# Patient Record
Sex: MALE | Race: WHITE | NOT HISPANIC OR LATINO | Employment: FULL TIME | URBAN - METROPOLITAN AREA
[De-identification: names, ages, dates, MRNs, and addresses within clinical notes are randomized per-mention and may not be internally consistent; named-entity substitution may affect disease eponyms.]

---

## 2018-05-28 ENCOUNTER — PATIENT OUTREACH (OUTPATIENT)
Dept: HEALTH INFORMATION MANAGEMENT | Facility: OTHER | Age: 73
End: 2018-05-28

## 2018-05-28 NOTE — PROGRESS NOTES
Outcome: wrong number    Please transfer to Patient Outreach Team at [phone] when patient returns call.      Attempt # 1

## 2018-10-04 DIAGNOSIS — Z01.810 PRE-OPERATIVE CARDIOVASCULAR EXAMINATION: ICD-10-CM

## 2018-10-04 DIAGNOSIS — Z01.812 PRE-OPERATIVE LABORATORY EXAMINATION: ICD-10-CM

## 2018-10-04 LAB
ANION GAP SERPL CALC-SCNC: 5 MMOL/L (ref 0–11.9)
BUN SERPL-MCNC: 13 MG/DL (ref 8–22)
CALCIUM SERPL-MCNC: 9.5 MG/DL (ref 8.5–10.5)
CHLORIDE SERPL-SCNC: 103 MMOL/L (ref 96–112)
CO2 SERPL-SCNC: 27 MMOL/L (ref 20–33)
CREAT SERPL-MCNC: 1.01 MG/DL (ref 0.5–1.4)
EKG IMPRESSION: NORMAL
GLUCOSE SERPL-MCNC: 168 MG/DL (ref 65–99)
POTASSIUM SERPL-SCNC: 4.6 MMOL/L (ref 3.6–5.5)
SODIUM SERPL-SCNC: 135 MMOL/L (ref 135–145)

## 2018-10-04 PROCEDURE — 80048 BASIC METABOLIC PNL TOTAL CA: CPT

## 2018-10-04 PROCEDURE — 36415 COLL VENOUS BLD VENIPUNCTURE: CPT

## 2018-10-04 PROCEDURE — 93010 ELECTROCARDIOGRAM REPORT: CPT | Performed by: INTERNAL MEDICINE

## 2018-10-04 PROCEDURE — 93005 ELECTROCARDIOGRAM TRACING: CPT

## 2018-10-04 RX ORDER — ATORVASTATIN CALCIUM 80 MG/1
80 TABLET, FILM COATED ORAL NIGHTLY
COMMUNITY

## 2018-10-04 RX ORDER — GLIPIZIDE 5 MG/1
10 TABLET ORAL DAILY
COMMUNITY

## 2018-10-04 RX ORDER — AMLODIPINE BESYLATE 2.5 MG/1
2.5 TABLET ORAL DAILY
COMMUNITY

## 2018-10-04 RX ORDER — MAGNESIUM OXIDE 420 MG/1
TABLET ORAL DAILY
COMMUNITY

## 2018-10-04 RX ORDER — LISINOPRIL 10 MG/1
10 TABLET ORAL DAILY
COMMUNITY

## 2018-10-04 RX ORDER — SAXAGLIPTIN 5 MG/1
5 TABLET, FILM COATED ORAL DAILY
COMMUNITY

## 2018-10-09 ENCOUNTER — HOSPITAL ENCOUNTER (OUTPATIENT)
Facility: MEDICAL CENTER | Age: 73
End: 2018-10-09
Attending: OPHTHALMOLOGY | Admitting: OPHTHALMOLOGY
Payer: MEDICARE

## 2018-10-09 VITALS
WEIGHT: 198.19 LBS | SYSTOLIC BLOOD PRESSURE: 136 MMHG | HEART RATE: 77 BPM | TEMPERATURE: 97.9 F | BODY MASS INDEX: 28.37 KG/M2 | HEIGHT: 70 IN | OXYGEN SATURATION: 90 % | DIASTOLIC BLOOD PRESSURE: 63 MMHG

## 2018-10-09 LAB
GLUCOSE BLD-MCNC: 145 MG/DL (ref 65–99)
GLUCOSE BLD-MCNC: 166 MG/DL (ref 65–99)

## 2018-10-09 PROCEDURE — 500855 HCHG NEEDLE, IRRIG CYSTOTOME 27G: Performed by: OPHTHALMOLOGY

## 2018-10-09 PROCEDURE — 160048 HCHG OR STATISTICAL LEVEL 1-5: Performed by: OPHTHALMOLOGY

## 2018-10-09 PROCEDURE — 700102 HCHG RX REV CODE 250 W/ 637 OVERRIDE(OP): Performed by: ANESTHESIOLOGY

## 2018-10-09 PROCEDURE — 160009 HCHG ANES TIME/MIN: Performed by: OPHTHALMOLOGY

## 2018-10-09 PROCEDURE — 160029 HCHG SURGERY MINUTES - 1ST 30 MINS LEVEL 4: Performed by: OPHTHALMOLOGY

## 2018-10-09 PROCEDURE — 700111 HCHG RX REV CODE 636 W/ 250 OVERRIDE (IP): Performed by: ANESTHESIOLOGY

## 2018-10-09 PROCEDURE — 82962 GLUCOSE BLOOD TEST: CPT

## 2018-10-09 PROCEDURE — 501749 HCHG SHELL REV 276: Performed by: OPHTHALMOLOGY

## 2018-10-09 PROCEDURE — 700101 HCHG RX REV CODE 250

## 2018-10-09 PROCEDURE — 160035 HCHG PACU - 1ST 60 MINS PHASE I: Performed by: OPHTHALMOLOGY

## 2018-10-09 PROCEDURE — 502000 HCHG MISC OR IMPLANTS RC 0278: Performed by: OPHTHALMOLOGY

## 2018-10-09 PROCEDURE — A6410 STERILE EYE PAD: HCPCS | Performed by: OPHTHALMOLOGY

## 2018-10-09 PROCEDURE — 700111 HCHG RX REV CODE 636 W/ 250 OVERRIDE (IP)

## 2018-10-09 PROCEDURE — A9270 NON-COVERED ITEM OR SERVICE: HCPCS | Performed by: ANESTHESIOLOGY

## 2018-10-09 PROCEDURE — 500558 HCHG EYE SHIELD W/GARTER (FOX): Performed by: OPHTHALMOLOGY

## 2018-10-09 PROCEDURE — 160036 HCHG PACU - EA ADDL 30 MINS PHASE I: Performed by: OPHTHALMOLOGY

## 2018-10-09 PROCEDURE — 160002 HCHG RECOVERY MINUTES (STAT): Performed by: OPHTHALMOLOGY

## 2018-10-09 PROCEDURE — 87102 FUNGUS ISOLATION CULTURE: CPT

## 2018-10-09 PROCEDURE — 501425 HCHG SPONGE, WECKCEL SPEAR: Performed by: OPHTHALMOLOGY

## 2018-10-09 PROCEDURE — 160041 HCHG SURGERY MINUTES - EA ADDL 1 MIN LEVEL 4: Performed by: OPHTHALMOLOGY

## 2018-10-09 DEVICE — IMPLANTABLE DEVICE: Type: IMPLANTABLE DEVICE | Site: EYE | Status: FUNCTIONAL

## 2018-10-09 RX ORDER — MORPHINE SULFATE 4 MG/ML
1 INJECTION, SOLUTION INTRAMUSCULAR; INTRAVENOUS
Status: DISCONTINUED | OUTPATIENT
Start: 2018-10-09 | End: 2018-10-09 | Stop reason: HOSPADM

## 2018-10-09 RX ORDER — CYCLOPENTOLATE HYDROCHLORIDE 10 MG/ML
SOLUTION/ DROPS OPHTHALMIC
Status: COMPLETED
Start: 2018-10-09 | End: 2018-10-09

## 2018-10-09 RX ORDER — MOXIFLOXACIN 5 MG/ML
SOLUTION/ DROPS OPHTHALMIC
Status: DISCONTINUED | OUTPATIENT
Start: 2018-10-09 | End: 2018-10-09 | Stop reason: HOSPADM

## 2018-10-09 RX ORDER — DIPHENHYDRAMINE HYDROCHLORIDE 50 MG/ML
12.5 INJECTION INTRAMUSCULAR; INTRAVENOUS
Status: DISCONTINUED | OUTPATIENT
Start: 2018-10-09 | End: 2018-10-09 | Stop reason: HOSPADM

## 2018-10-09 RX ORDER — MEPERIDINE HYDROCHLORIDE 25 MG/ML
6.25 INJECTION INTRAMUSCULAR; INTRAVENOUS; SUBCUTANEOUS
Status: DISCONTINUED | OUTPATIENT
Start: 2018-10-09 | End: 2018-10-09 | Stop reason: HOSPADM

## 2018-10-09 RX ORDER — MORPHINE SULFATE 10 MG/ML
5 INJECTION, SOLUTION INTRAMUSCULAR; INTRAVENOUS
Status: DISCONTINUED | OUTPATIENT
Start: 2018-10-09 | End: 2018-10-09 | Stop reason: HOSPADM

## 2018-10-09 RX ORDER — MOXIFLOXACIN 5 MG/ML
SOLUTION/ DROPS OPHTHALMIC
Status: COMPLETED
Start: 2018-10-09 | End: 2018-10-09

## 2018-10-09 RX ORDER — OXYCODONE HCL 5 MG/5 ML
10 SOLUTION, ORAL ORAL
Status: COMPLETED | OUTPATIENT
Start: 2018-10-09 | End: 2018-10-09

## 2018-10-09 RX ORDER — HALOPERIDOL 5 MG/ML
1 INJECTION INTRAMUSCULAR
Status: DISCONTINUED | OUTPATIENT
Start: 2018-10-09 | End: 2018-10-09 | Stop reason: HOSPADM

## 2018-10-09 RX ORDER — SODIUM CHLORIDE 9 MG/ML
INJECTION, SOLUTION INTRAVENOUS CONTINUOUS
Status: DISCONTINUED | OUTPATIENT
Start: 2018-10-09 | End: 2018-10-09 | Stop reason: HOSPADM

## 2018-10-09 RX ORDER — OXYCODONE HCL 5 MG/5 ML
5 SOLUTION, ORAL ORAL
Status: COMPLETED | OUTPATIENT
Start: 2018-10-09 | End: 2018-10-09

## 2018-10-09 RX ORDER — SODIUM CHLORIDE 9 MG/ML
INJECTION, SOLUTION INTRAVENOUS CONTINUOUS
Status: ACTIVE | OUTPATIENT
Start: 2018-10-09 | End: 2018-10-09

## 2018-10-09 RX ORDER — PHENYLEPHRINE HYDROCHLORIDE 25 MG/ML
SOLUTION/ DROPS OPHTHALMIC
Status: COMPLETED
Start: 2018-10-09 | End: 2018-10-09

## 2018-10-09 RX ORDER — OXYCODONE HYDROCHLORIDE 5 MG/1
5 TABLET ORAL
Status: DISCONTINUED | OUTPATIENT
Start: 2018-10-09 | End: 2018-10-09 | Stop reason: HOSPADM

## 2018-10-09 RX ORDER — OXYCODONE HYDROCHLORIDE 5 MG/1
10 TABLET ORAL
Status: DISCONTINUED | OUTPATIENT
Start: 2018-10-09 | End: 2018-10-09 | Stop reason: HOSPADM

## 2018-10-09 RX ORDER — ONDANSETRON 2 MG/ML
4 INJECTION INTRAMUSCULAR; INTRAVENOUS
Status: DISCONTINUED | OUTPATIENT
Start: 2018-10-09 | End: 2018-10-09 | Stop reason: HOSPADM

## 2018-10-09 RX ORDER — TETRACAINE HYDROCHLORIDE 5 MG/ML
SOLUTION OPHTHALMIC
Status: COMPLETED
Start: 2018-10-09 | End: 2018-10-09

## 2018-10-09 RX ORDER — NEOMYCIN SULFATE, POLYMYXIN B SULFATE, AND DEXAMETHASONE 3.5; 10000; 1 MG/G; [USP'U]/G; MG/G
OINTMENT OPHTHALMIC
Status: DISCONTINUED | OUTPATIENT
Start: 2018-10-09 | End: 2018-10-09 | Stop reason: HOSPADM

## 2018-10-09 RX ORDER — SODIUM CHLORIDE, SODIUM LACTATE, POTASSIUM CHLORIDE, CALCIUM CHLORIDE 600; 310; 30; 20 MG/100ML; MG/100ML; MG/100ML; MG/100ML
INJECTION, SOLUTION INTRAVENOUS CONTINUOUS
Status: DISCONTINUED | OUTPATIENT
Start: 2018-10-09 | End: 2018-10-09 | Stop reason: CLARIF

## 2018-10-09 RX ORDER — MORPHINE SULFATE 4 MG/ML
2 INJECTION, SOLUTION INTRAMUSCULAR; INTRAVENOUS
Status: DISCONTINUED | OUTPATIENT
Start: 2018-10-09 | End: 2018-10-09 | Stop reason: HOSPADM

## 2018-10-09 RX ADMIN — CYCLOPENTOLATE HYDROCHLORIDE 1 DROP: 10 SOLUTION/ DROPS OPHTHALMIC at 06:00

## 2018-10-09 RX ADMIN — PHENYLEPHRINE HYDROCHLORIDE 1 DROP: 2.5 SOLUTION/ DROPS OPHTHALMIC at 06:00

## 2018-10-09 RX ADMIN — OXYCODONE HYDROCHLORIDE 10 MG: 5 SOLUTION ORAL at 09:35

## 2018-10-09 RX ADMIN — MOXIFLOXACIN HYDROCHLORIDE 1 DROP: 5 SOLUTION/ DROPS OPHTHALMIC at 06:00

## 2018-10-09 RX ADMIN — FENTANYL CITRATE 50 MCG: 50 INJECTION, SOLUTION INTRAMUSCULAR; INTRAVENOUS at 09:35

## 2018-10-09 RX ADMIN — TETRACAINE HYDROCHLORIDE 1 DROP: 5 SOLUTION OPHTHALMIC at 06:00

## 2018-10-09 RX ADMIN — SODIUM CHLORIDE 1000 ML: 9 INJECTION, SOLUTION INTRAVENOUS at 06:53

## 2018-10-09 ASSESSMENT — PAIN SCALES - GENERAL
PAINLEVEL_OUTOF10: 0
PAINLEVEL_OUTOF10: 5
PAINLEVEL_OUTOF10: 0
PAINLEVEL_OUTOF10: 0
PAINLEVEL_OUTOF10: 5
PAINLEVEL_OUTOF10: 0
PAINLEVEL_OUTOF10: 4
PAINLEVEL_OUTOF10: 0
PAINLEVEL_OUTOF10: 0

## 2018-10-09 NOTE — OR NURSING
0895-Received from OR via [facility]. S/P right cataract   Bedside report received from RN. And Anesthesiologist. Dr. Rockwell at bedside. Stressed instructions to keep patient supine with head straight. Strict for 60 min. In PACU. Pt. Grabbing at oxygen and face. Requires constant reminders.    0915-wife at bedside. Assisting with keeping patient supine, head straight.     0944-medicated for pain. See mar. Pt. Remains flat.    [id number]    1045-nauseated when getting dressed. Cool wash cloth to forehead and queeseEase given. Sipping on ginger ale.    1100-meets discharge criteria. Iv removed. Instructions explained to  Wife and patient  All questions answered. Discharged home with responsible party. Escorted to car via wheelchair.

## 2018-10-09 NOTE — DISCHARGE INSTRUCTIONS
HOME CARE INSTRUCTIONS FOR CATARACT SURGERY    ACTIVITY: Rest and take it easy for the first 24 hours. We strongly suggest that a responsible adult remain with you during that time. It is normal to feel sleepy. We encourage you to not do anything that requires balance, judgment or coordination. Be extra careful when walking (with a dilated eye, it is easier to trip and fall).     NO BENDING STRAINING OR STOOPING OR LIFTING UNTIL APPROVED BY DR. MORALES.    FOR 24 HOURS, DO NOT:       Drive, operate machinery or run household appliances.        Drink beer or alcoholic beverages.        Make important decisions or sign legal documents.     DIET: To avoid nausea, slowly advance diet as tolerated, avoiding spicy or greasy foods for the first meal.     MEDICATIONS: Resume taking daily medication. You may take Tylenol for mild discomfort, if needed.     May take Hydrocodone for pain as needed. Last dose was given at 9:30am.    SURGICAL DRESSING: Eye shield as instructed by your doctor. Dark glasses should be worn while in the sunlight.     Follow your Physician's instruction Sheet. Eye Kit Given    A follow-up appointment is scheduled with your doctor tomorrow at _9:00______ am/pm.     You should call 911 if you develop problems with breathing or chest pain.  You should CALL YOUR PHYSICIAN if you develop: Sharp stabbing pain or sudden change in vision in your operative eye. If you are unable to contact your doctor or the surgical center, you should go to the nearest emergency room or urgent care center. Physician's telephone # _[phone]_________________________    If any questions arise, call your doctor. If your doctor is not available, please feel free to call Same Day Surgery at [phone]. You can also call the [facility] Hotline open 24 hours/day, 7 days/week and speak to a nurse at [phone] or [phone].     I acknowledge receipt and understanding of these Home Care  Instructions.    ______________________________     _______________________________            Signature of Patient / Responsible Adult                                                       RN Signature    A registered nurse may call you a few days after your surgery to see how you are doing.   You may also receive a survey in the mail within the next two weeks and we ask that you take a few moments to complete and return the survey. Our goal is to provide you with very good care and we value your comments. Thank you for choosing [facility].

## 2018-10-09 NOTE — OP REPORT
DATE OF SERVICE:  10/09/2018    PROCEDURES PERFORMED:  1.  Descemet stripping endothelial keratoplasty, right eye.  2.  Phacoemulsification of cataract with implantation of intraocular lens.    PREOPERATIVE DIAGNOSES:  1.  Corneal edema of unspecified etiology, right eye.  2.  Visually significant cataract, right eye.    POSTOPERATIVE DIAGNOSES:  1.  Corneal edema of unspecified etiology, right eye.  2.  Visually significant cataract, right eye.    INDICATIONS FOR SURGERY:  This is a gentleman with endothelial decompensation   and subsequent corneal edema also in the setting of visually significant   cataract, who required both cataract surgery and replacement of endothelium   for return of functional vision in the right eye.    ANESTHESIA:  General.    COMPLICATIONS:  None.    BLOOD LOSS:  Minimal.    SPECIMENS:  Donor corneal rim for cultures.    Risks, benefits, alternatives and indications for surgery were reviewed with   the patient preoperatively and all questions were answered, informed consent   was obtained.  On the day of surgery, the patient was brought to the operating   room where the right eye was prepped and draped.  Paracentesis was placed at   the limbus x2 and the eye filled with Healon.  A temporal clear corneal   incision was then made and a cystotome followed by Utrata forceps were used to   create a capsulotomy in the anterior lens capsule.  Of note, the lens capsule   was noted to be very friable and nonelastic.  A capsulotomy was performed   without radialization however.  Saline solution was used to hydrodissect the   cataract, which was then removed completely using phacoemulsification followed   by removal of cortex using aspiration.  The capsular bag was reinflated with   Healon and found to be completely intact and the intraocular lens was placed.    This was from [employer], it was a PCBOO, power 26.5 diopter, serial #[id number].    Irrigation and aspiration was then used to remove  viscoelastic completely   following a descemetorhexis with the reverse Sinskey.  This was performed to a   diameter of 8.0 mm and descemet endothelium removed completely.  An inferior   iridotomy was placed using a bent 30-gauge needle and reverse Sinskey at the   iris at approximately 6 o'clock.  Attention was then brought to the donor   corneal tissue, which was from the [facility] with tissue   #[id number] and donor details as follows:  A 58-year-old female, primary cause   of death hypertensive cardiovascular disease, date of death 10/02/2018.  Death   preservation time 15 hours 24 minutes, total ocular cooling time of 5 hours   and 35 minutes.  Preoperative cell count 3205, clear zone 8.5 mm.  The graft   thickness as reported by the eye bank was 111 microns.  This tissue was   punched to a diameter of 8.0 mm and loaded into mini Busin glide, which was   then inserted into the anterior chamber and brought into the eye using   insertion forceps.  A 10-0 nylon was used to close the main corneal wound and   the knots buried into corneal stroma.  A high pressure air fill was used to   fixate the donor graft to the posterior native stroma and after 10 minutes   elapsed on the clock, a small air fluid exchange was performed, the eye given   topical moxifloxacin and then gently patched and shielded at the end of the   case.  The patient was transferred to the postanesthesia care unit in stable   condition.       ____________________________________     MD ARPAN Mccoy / ELIZABETH    DD:  10/09/2018 12:14:35  DT:  10/09/2018 13:21:18    D#:  [id number]  Job#:  [id number]

## 2018-10-15 ENCOUNTER — PATIENT OUTREACH (OUTPATIENT)
Dept: HEALTH INFORMATION MANAGEMENT | Facility: OTHER | Age: 73
End: 2018-10-15

## 2018-10-15 NOTE — PROGRESS NOTES
Outcome: left message  Please transfer to Patient Outreach Team at [phone] when patient returns call.    WebIZ checked and Epic Updated: yes    [facility] verified: yes    Attempt: 1

## 2018-11-07 LAB
FUNGUS SPEC CULT: NORMAL
SIGNIFICANT IND 70042: NORMAL
SITE SITE: NORMAL
SOURCE SOURCE: NORMAL

## 2021-01-11 DIAGNOSIS — Z23 NEED FOR VACCINATION: ICD-10-CM

## 2021-05-12 ENCOUNTER — PATIENT OUTREACH (OUTPATIENT)
Dept: HEALTH INFORMATION MANAGEMENT | Facility: OTHER | Age: 76
End: 2021-05-12

## 2022-04-12 ENCOUNTER — TELEPHONE (OUTPATIENT)
Dept: HEALTH INFORMATION MANAGEMENT | Facility: OTHER | Age: 77
End: 2022-04-12

## 2022-06-21 ENCOUNTER — OFFICE VISIT (OUTPATIENT)
Dept: URGENT CARE | Facility: PHYSICIAN GROUP | Age: 77
End: 2022-06-21
Payer: MEDICARE

## 2022-06-21 ENCOUNTER — APPOINTMENT (OUTPATIENT)
Dept: RADIOLOGY | Facility: IMAGING CENTER | Age: 77
End: 2022-06-21
Attending: STUDENT IN AN ORGANIZED HEALTH CARE EDUCATION/TRAINING PROGRAM
Payer: MEDICARE

## 2022-06-21 VITALS
TEMPERATURE: 98.4 F | OXYGEN SATURATION: 97 % | HEART RATE: 78 BPM | RESPIRATION RATE: 16 BRPM | WEIGHT: 184.8 LBS | SYSTOLIC BLOOD PRESSURE: 118 MMHG | HEIGHT: 71 IN | DIASTOLIC BLOOD PRESSURE: 50 MMHG | BODY MASS INDEX: 25.87 KG/M2

## 2022-06-21 DIAGNOSIS — S61.217A LACERATION OF LEFT LITTLE FINGER WITHOUT FOREIGN BODY WITHOUT DAMAGE TO NAIL, INITIAL ENCOUNTER: ICD-10-CM

## 2022-06-21 DIAGNOSIS — S69.91XA INJURY OF RIGHT WRIST, INITIAL ENCOUNTER: ICD-10-CM

## 2022-06-21 DIAGNOSIS — S67.21XA CRUSHING INJURY OF RIGHT HAND, INITIAL ENCOUNTER: ICD-10-CM

## 2022-06-21 PROCEDURE — 90715 TDAP VACCINE 7 YRS/> IM: CPT | Performed by: STUDENT IN AN ORGANIZED HEALTH CARE EDUCATION/TRAINING PROGRAM

## 2022-06-21 PROCEDURE — 90471 IMMUNIZATION ADMIN: CPT | Performed by: STUDENT IN AN ORGANIZED HEALTH CARE EDUCATION/TRAINING PROGRAM

## 2022-06-21 PROCEDURE — 99204 OFFICE O/P NEW MOD 45 MIN: CPT | Mod: 25 | Performed by: STUDENT IN AN ORGANIZED HEALTH CARE EDUCATION/TRAINING PROGRAM

## 2022-06-21 PROCEDURE — 73110 X-RAY EXAM OF WRIST: CPT | Mod: TC,RT | Performed by: STUDENT IN AN ORGANIZED HEALTH CARE EDUCATION/TRAINING PROGRAM

## 2022-06-21 NOTE — PROGRESS NOTES
Subjective:   CHIEF COMPLAINT  Chief Complaint   Patient presents with   • Hand Injury     Tripped and fell when his forklift rolled, injuring right forearm and the inside of the baby finger.         HPI  Jonathan Oh is a 76 y.o. male who presents with a chief complaint of a crush injury to his right hand.  Earlier this morning the emergency brake failed on his forklift.  Patient landed to the forklift to try and hit the brakes tripped, fell to the ground and the back rear wheel of the forklift ran over his hand.  Said there was quite a bit of skin hanging off the dorsal aspect, ulnarly of his hand which he removed and then came to urgent care for further evaluation.  He has not taken any medications to help with symptomatic relief.  He is not on blood thinners.  He is uncertain the last time he received his tetanus shot.    REVIEW OF SYSTEMS  General: no fever or chills  GI: no nausea or vomiting  See HPI for further details.    PAST MEDICAL HISTORY  There are no problems to display for this patient.      SURGICAL HISTORY   has a past surgical history that includes clavicle resection (1990s); cataract phaco with iol (Right, 10/9/2018); and penetrating keratoplasty (Right, 10/9/2018).    ALLERGIES  No Known Allergies    CURRENT MEDICATIONS  Home Medications     Reviewed by Victor Manuel Flanagan D.O. (Physician) on 06/21/22 at 1621  Med List Status: <None>   Medication Last Dose Status   amLODIPine (NORVASC) 2.5 MG Tab Taking Active   asa/apap/caffeine (EXCEDRIN) 250-250-65 MG Tab PRN Active   atorvastatin (LIPITOR) 80 MG tablet Taking Active   glipiZIDE (GLUCOTROL) 5 MG Tab Taking Active   lisinopril (PRINIVIL) 10 MG Tab Taking Active   Magnesium Oxide 420 MG Tab Taking Active   metFORMIN (GLUCOPHAGE) 500 MG Tab Taking Active   SAXagliptin HCl 5 MG Tab Taking Active                SOCIAL HISTORY  Social History     Tobacco Use   • Smoking status: Former Smoker     Packs/day: 0.50     Years: 34.00     Pack years:  "17.00     Types: Cigarettes     Quit date: 1993     Years since quittin.4   • Smokeless tobacco: Never Used   Vaping Use   • Vaping Use: Never used   Substance and Sexual Activity   • Alcohol use: No   • Drug use: No   • Sexual activity: Not Currently       FAMILY HISTORY  No family history on file.       Objective:   PHYSICAL EXAM  VITAL SIGNS: /50 (BP Location: Right arm, Patient Position: Sitting, BP Cuff Size: Adult)   Pulse 78   Temp 36.9 °C (98.4 °F) (Temporal)   Resp 16   Ht 1.791 m (5' 10.5\")   Wt 83.8 kg (184 lb 12.8 oz)   SpO2 97%   BMI 26.14 kg/m²     Gen: no acute distress, normal voice  Skin: Right forearm/wrist/hand: 8 cm x 5 cm skin tear on the ulnar aspect of the dorsal wrist/hand.  2 additional skin tears but to lesser extent along the distal-to-middle one third of the forearm.  2.4 cm laceration along the volar aspect of the fifth digit at the level of the MCPJ  Head: Atraumatic, normocephalic  Psych: normal affect, normal judgement, alert, awake  Musculoskeletal: Right breast/hand: Edema along the dorsal aspect of the hand localized to the region of the fourth and fifth metacarpals.  Ecchymosis palmar surface at the level middle one third fourth and fifth metacarpals.  Good wrist ROM without any discernible discomfort.  Loss of composite flexion of digits 3-5 secondary to edema.  TTP along the middle one third of the fourth metacarpal.  No carpal tenderness to palpation.  Brisk capillary refill all 5 digits.      RADIOLOGY RESULTS   DX-WRIST-COMPLETE 3+ RIGHT    Result Date: 2022 3:56 PM HISTORY/REASON FOR EXAM:  Pain/Deformity Following Trauma; TTP fourth and fifth metacarpals; crushing injury. TECHNIQUE/EXAM DESCRIPTION AND NUMBER OF VIEWS:  4 views of the  RIGHT wrist. COMPARISON: None FINDINGS: MINERALIZATION: Mineralization is unremarkable for age. INJURY: No acute fracture or gross malalignment is seen. JOINTS: No erosive arthropathy is evident.     No " radiographic evidence of acute traumatic injury. Given history of acute trauma pain persists repeat radiograph in 7-10 days may better show a fracture.         Procedure: Laceration Repair  -Wound was thoroughly cleaned with sterile saline  -No tendon/nerve/vascular involvement. No contamination  -Clean technique with sterile instruments and sutures used  -Local anesthesia with 1% lidocaine with epinephrine for (approximately 4 cc total)  -Closed with 3# 5-0 Ethilon interrupted sutures with good approximation  -Polysporin and dressing placed  -The patient tolerated the procedure well with no immediate complications.  There was nominal blood loss.        Assessment/Plan:     1. Laceration of right little finger without foreign body without damage to nail, initial encounter  DX-WRIST-COMPLETE 3+ RIGHT    Tdap =>8yo IM    CANCELED: TD Preservative Free =>8yo IM   2. Injury of right wrist, initial encounter  DX-WRIST-COMPLETE 3+ RIGHT    Tdap =>8yo IM   3. Crushing injury of right hand, initial encounter  Tdap =>6yo IM   1) skin tears and laceration were thoroughly flushed with sterile saline.  Tetanus was updated.  Laceration was closed with 3 interrupted sutures.  Wound site and skin tears were then covered with Xeroform, Telfa and wrapped with Coban.  Home supplies were given.  Tetanus was updated.  - Return to clinic in 7 to 10 days for suture removal  - Discussed routine wound care and red flags for infection    2) multiple views of the right hand were negative for any acute osseous abnormalities.  If still having metacarpal pain at follow-up visit, consider follow-up x-ray.  - Recommended symptomatic treatment with ibuprofen/Motrin as needed  - Ice and elevation  - Return to urgent care any new/worsening symptoms or further questions or concerns.  Patient understood everything discussed.  All questions were answered.    Differential diagnosis, natural history, supportive care, and indications for immediate  follow-up discussed. All questions answered. Patient agrees with the plan of care.    Follow-up as needed if symptoms worsen or fail to improve to PCP, Urgent care or Emergency Room.    46 minutes was spent caring for this patient on the day of the encounter which included face-to-face time, wound care (separate from laceration repair), reviewing the x-rays, discussing the diagnosis, medical management, discussing the need for follow-up, return/ED precautions and completion of the chart. This does not include time spent on separately billable procedures/tests (The 46 was time spent outside of the laceration repair).    Please note that this dictation was created using voice recognition software. I have made a reasonable attempt to correct obvious errors, but I expect that there are errors of grammar and possibly content that I did not discover before finalizing the note.

## 2022-06-24 ENCOUNTER — OFFICE VISIT (OUTPATIENT)
Dept: URGENT CARE | Facility: PHYSICIAN GROUP | Age: 77
End: 2022-06-24
Payer: MEDICARE

## 2022-06-24 VITALS
SYSTOLIC BLOOD PRESSURE: 116 MMHG | HEART RATE: 82 BPM | WEIGHT: 184 LBS | OXYGEN SATURATION: 98 % | TEMPERATURE: 98.1 F | DIASTOLIC BLOOD PRESSURE: 66 MMHG | BODY MASS INDEX: 25.76 KG/M2 | HEIGHT: 71 IN

## 2022-06-24 DIAGNOSIS — L03.113 CELLULITIS OF RIGHT HAND: ICD-10-CM

## 2022-06-24 DIAGNOSIS — L08.9 INFECTED ABRASION OF RIGHT HAND, INITIAL ENCOUNTER: ICD-10-CM

## 2022-06-24 DIAGNOSIS — S60.511A INFECTED ABRASION OF RIGHT HAND, INITIAL ENCOUNTER: ICD-10-CM

## 2022-06-24 DIAGNOSIS — T14.8XXA CRUSH INJURY: ICD-10-CM

## 2022-06-24 PROCEDURE — 99214 OFFICE O/P EST MOD 30 MIN: CPT | Performed by: NURSE PRACTITIONER

## 2022-06-24 RX ORDER — SULFAMETHOXAZOLE AND TRIMETHOPRIM 800; 160 MG/1; MG/1
1 TABLET ORAL 2 TIMES DAILY
Qty: 14 TABLET | Refills: 0 | Status: SHIPPED | OUTPATIENT
Start: 2022-06-24 | End: 2022-07-01

## 2022-06-24 RX ORDER — ACETAMINOPHEN AND CODEINE PHOSPHATE 300; 30 MG/1; MG/1
1 TABLET ORAL EVERY 6 HOURS PRN
Qty: 8 TABLET | Refills: 0 | Status: SHIPPED | OUTPATIENT
Start: 2022-06-24 | End: 2022-06-27

## 2022-06-24 NOTE — PROGRESS NOTES
"Subjective:   Jonathan Oh is a 76 y.o. male who presents for Edema (Edema right hand.  )    [facility]  Patient presents for evaluation of right hand edema, erythema, and ecchymosis.  Patient was seen in urgent care 3 days ago, following a crush injury of his right hand.  Patient states that the forklift excellently ran over his hand.  He has had sutures placed and Tdap was updated.  Patient has noticed increased swelling, redness, and pain pain this is caused him inability to sleep or rest secondary to the significant pain.  His taken over-the-counter Tylenol and Advil without relief of his symptoms.  Denies fever or chills, change in sensation, numbness or tingling.    ROS  All other systems are negative except as documented above within [facility].    MEDS:   Current Outpatient Medications:   •  atorvastatin (LIPITOR) 80 MG tablet, Take 80 mg by mouth every evening., Disp: , Rfl:   •  amLODIPine (NORVASC) 2.5 MG Tab, Take 2.5 mg by mouth every day., Disp: , Rfl:   •  metFORMIN (GLUCOPHAGE) 500 MG Tab, Take 500 mg by mouth 2 times a day., Disp: , Rfl:   •  lisinopril (PRINIVIL) 10 MG Tab, Take 10 mg by mouth every day., Disp: , Rfl:   •  SAXagliptin HCl 5 MG Tab, Take 5 mg by mouth every day., Disp: , Rfl:   •  glipiZIDE (GLUCOTROL) 5 MG Tab, Take 10 mg by mouth every day., Disp: , Rfl:   •  Magnesium Oxide 420 MG Tab, Take  by mouth every day., Disp: , Rfl:   •  asa/apap/caffeine (EXCEDRIN) 250-250-65 MG Tab, Take 1 Tab by mouth as needed for Headache., Disp: , Rfl:   ALLERGIES: No Known Allergies    Patient's PMH, SocHx, SurgHx, FamHx, Drug allergies and medications were reviewed.     Objective:   /66   Pulse 82   Temp 36.7 °C (98.1 °F) (Temporal)   Ht 1.791 m (5' 10.5\")   Wt 83.5 kg (184 lb)   SpO2 98%   BMI 26.03 kg/m²     Physical Exam  Vitals and nursing note reviewed.   Constitutional:       General: He is awake.      Appearance: Normal appearance. He is well-developed.   HENT:      Head: Normocephalic " and atraumatic.      Right Ear: External ear normal.      Left Ear: External ear normal.      Nose: Nose normal.      Mouth/Throat:      Lips: Pink.      Mouth: Mucous membranes are moist.      Pharynx: Oropharynx is clear.   Eyes:      General: Lids are normal.      Extraocular Movements: Extraocular movements intact.      Conjunctiva/sclera: Conjunctivae normal.   Cardiovascular:      Rate and Rhythm: Normal rate and regular rhythm.   Pulmonary:      Effort: Pulmonary effort is normal.   Musculoskeletal:         General: Normal range of motion.        Hands:       Cervical back: Normal range of motion.      Comments: Extensive edema, ecchymosis, and erythema involving the right hand, wrist, and forearm.  Skin tear noted on right hand as diagrammed with associated tenderness.  Scant amount of drainage present.  Distal N/V intact.   Skin:     General: Skin is warm and dry.   Neurological:      Mental Status: He is alert and oriented to person, place, and time.   Psychiatric:         Mood and Affect: Mood normal.         Behavior: Behavior normal. Behavior is cooperative.         Thought Content: Thought content normal.         Judgment: Judgment normal.         Assessment/Plan:   Assessment    1. Infected abrasion of right hand, initial encounter  - mupirocin (BACTROBAN) 2 % Ointment; Apply 1 Application topically 2 times a day.  Dispense: 22 g; Refill: 0  - sulfamethoxazole-trimethoprim (BACTRIM DS) 800-160 MG tablet; Take 1 Tablet by mouth 2 times a day for 7 days.  Dispense: 14 Tablet; Refill: 0  - Acetaminophen-Codeine (TYLENOL/CODEINE #3) 300-30 MG Tab; Take 1 Tablet by mouth every 6 hours as needed (pain) for up to 3 days.  Dispense: 8 Tablet; Refill: 0    2. Cellulitis of right hand  - mupirocin (BACTROBAN) 2 % Ointment; Apply 1 Application topically 2 times a day.  Dispense: 22 g; Refill: 0  - sulfamethoxazole-trimethoprim (BACTRIM DS) 800-160 MG tablet; Take 1 Tablet by mouth 2 times a day for 7 days.   Dispense: 14 Tablet; Refill: 0  - Acetaminophen-Codeine (TYLENOL/CODEINE #3) 300-30 MG Tab; Take 1 Tablet by mouth every 6 hours as needed (pain) for up to 3 days.  Dispense: 8 Tablet; Refill: 0    3. Crush injury  - sulfamethoxazole-trimethoprim (BACTRIM DS) 800-160 MG tablet; Take 1 Tablet by mouth 2 times a day for 7 days.  Dispense: 14 Tablet; Refill: 0  - Acetaminophen-Codeine (TYLENOL/CODEINE #3) 300-30 MG Tab; Take 1 Tablet by mouth every 6 hours as needed (pain) for up to 3 days.  Dispense: 8 Tablet; Refill: 0      Vital signs stable at today's acute urgent care visit. Begin medications as listed. Discussed management options as indicated and care discussed with patient, supplies given to him in clinic.  Patient requesting pain medication due to severe pain and ability to sleep.  PDMP reviewed prior to prescribing Tylenol with codeine, advised to take very sparingly and only for severe pain.    Advised the patient to follow-up with the primary care provider for recheck, reevaluation, and/or consideration of further management, he currently has an appointment in 3 days. Return to urgent care with any worsening/continued symptoms.  Strict red flags discussed and indications to immediately call 911 or present to the ED.  All questions were encouraged and answered to the patient's satisfaction and understanding, and they agree to the plan of care.     I personally reviewed prior external notes and test results pertinent to today and independently reviewed and interpreted all diagnostics ordered during this urgent care acute visit. Time spent evaluating this patient includes preparing for visit, counseling/education, exam, evaluation, obtaining history, and ordering lab/test/procedures.      Please note that this dictation was created using voice recognition software. I have made a reasonable attempt to correct obvious errors, but I expect that there are errors of grammar and possibly content that I did not  discover before finalizing the note.

## 2022-06-27 ENCOUNTER — OFFICE VISIT (OUTPATIENT)
Dept: URGENT CARE | Facility: PHYSICIAN GROUP | Age: 77
End: 2022-06-27
Payer: MEDICARE

## 2022-06-27 VITALS
HEART RATE: 86 BPM | TEMPERATURE: 98.6 F | OXYGEN SATURATION: 94 % | BODY MASS INDEX: 25.76 KG/M2 | RESPIRATION RATE: 16 BRPM | HEIGHT: 71 IN | WEIGHT: 184 LBS | SYSTOLIC BLOOD PRESSURE: 102 MMHG | DIASTOLIC BLOOD PRESSURE: 58 MMHG

## 2022-06-27 DIAGNOSIS — S61.411D SKIN TEAR OF RIGHT HAND WITHOUT COMPLICATION, SUBSEQUENT ENCOUNTER: ICD-10-CM

## 2022-06-27 DIAGNOSIS — S67.21XD CRUSHING INJURY OF RIGHT HAND, SUBSEQUENT ENCOUNTER: ICD-10-CM

## 2022-06-27 DIAGNOSIS — S61.217D LACERATION OF LEFT LITTLE FINGER WITHOUT FOREIGN BODY WITHOUT DAMAGE TO NAIL, SUBSEQUENT ENCOUNTER: ICD-10-CM

## 2022-06-27 DIAGNOSIS — S69.91XD INJURY OF RIGHT WRIST, SUBSEQUENT ENCOUNTER: ICD-10-CM

## 2022-06-27 PROCEDURE — 99213 OFFICE O/P EST LOW 20 MIN: CPT | Performed by: PHYSICIAN ASSISTANT

## 2022-06-27 RX ORDER — ACETAMINOPHEN AND CODEINE PHOSPHATE 300; 30 MG/1; MG/1
1 TABLET ORAL EVERY 6 HOURS PRN
Qty: 8 TABLET | Refills: 0 | Status: SHIPPED | OUTPATIENT
Start: 2022-06-27 | End: 2022-06-30

## 2022-06-27 ASSESSMENT — ENCOUNTER SYMPTOMS
FEVER: 0
NAUSEA: 0
VOMITING: 0
CHILLS: 0
SENSORY CHANGE: 0
TINGLING: 0

## 2022-06-27 NOTE — PROGRESS NOTES
Subjective     Jonathan Oh is a 76 y.o. male who presents with Wound Check (Stitches removal , would like refill on pain medication )      HPI:  Jonathan Oh is a 76 y.o. male who presents today for evaluation of reevaluation of right hand/wrist injury as well as wound checks.  Patient was initially seen on 6/21/2022 for evaluation of a crush injury to his right hand/wrist.  He states that he was moving a forklift when the emergency brakes failed.  He ran to try to pull the emergency brake but tripped and fell and put his right hand/arm out to brace his fall.  As he did this the wheel of the forklift ran over his hand.  He had an x-ray of his wrist done at his initial visit which did not reveal any acute osseous abnormality.  He also had a laceration of his left small finger repaired with sutures.  He return for reevaluation on 6/24 stating increased redness, swelling, and pain.  He was found to have an infected abrasion/skin tear of his right hand which was treated with Bactrim and mupirocin ointment and he was also prescribed some Tylenol 3 to help with the pain.  He says that the pain medication does provide significant relief.  Pain still not being relieved with OTC medications.  He has noted some improvement in the redness and swelling since his previous visit.  He has not had any fever/chills or numbness/tingling.      Review of Systems   Constitutional: Negative for chills and fever.   Gastrointestinal: Negative for nausea and vomiting.   Musculoskeletal:        Injury of right wrist/hand   Skin:        Skin teat of right hand. Laceration of right hand   Neurological: Negative for tingling and sensory change.           PMH:  has a past medical history of Anesthesia, Cataract, Dental disorder, Diabetes (HCC), High cholesterol, and Hypertension.  MEDS:   Current Outpatient Medications:   •  Acetaminophen-Codeine (TYLENOL/CODEINE #3) 300-30 MG Tab, Take 1 Tablet by mouth every 6 hours as needed (moderate  to severe pain) for up to 3 days., Disp: 8 Tablet, Rfl: 0  •  mupirocin (BACTROBAN) 2 % Ointment, Apply 1 Application topically 2 times a day., Disp: 22 g, Rfl: 0  •  sulfamethoxazole-trimethoprim (BACTRIM DS) 800-160 MG tablet, Take 1 Tablet by mouth 2 times a day for 7 days., Disp: 14 Tablet, Rfl: 0  •  atorvastatin (LIPITOR) 80 MG tablet, Take 80 mg by mouth every evening., Disp: , Rfl:   •  amLODIPine (NORVASC) 2.5 MG Tab, Take 2.5 mg by mouth every day., Disp: , Rfl:   •  metFORMIN (GLUCOPHAGE) 500 MG Tab, Take 500 mg by mouth 2 times a day., Disp: , Rfl:   •  lisinopril (PRINIVIL) 10 MG Tab, Take 10 mg by mouth every day., Disp: , Rfl:   •  SAXagliptin HCl 5 MG Tab, Take 5 mg by mouth every day., Disp: , Rfl:   •  glipiZIDE (GLUCOTROL) 5 MG Tab, Take 10 mg by mouth every day., Disp: , Rfl:   •  Magnesium Oxide 420 MG Tab, Take  by mouth every day., Disp: , Rfl:   •  asa/apap/caffeine (EXCEDRIN) 250-250-65 MG Tab, Take 1 Tab by mouth as needed for Headache., Disp: , Rfl:   ALLERGIES: No Known Allergies  SURGHX:   Past Surgical History:   Procedure Laterality Date   • CATARACT PHACO WITH IOL Right 10/9/2018    Procedure: CATARACT PHACO WITH IOL;  Surgeon: Yoel Rockwell M.D.;  Location: SURGERY SAME DAY [facility];  Service: Ophthalmology   • PENETRATING KERATOPLASTY Right 10/9/2018    Procedure: DESCEMETS STRIPPING ENDOTHELIAL KERATOPLASTY;  Surgeon: Yoel Rockwell M.D.;  Location: SURGERY SAME DAY [facility];  Service: Ophthalmology   • CLAVICLE RESECTION  1990s     SOCHX:  reports that he quit smoking about 29 years ago. His smoking use included cigarettes. He has a 17.00 pack-year smoking history. He has never used smokeless tobacco. He reports that he does not drink alcohol and does not use drugs.  FH: Family history was reviewed, no pertinent findings to report      Objective     /58 (BP Location: Right arm, Patient Position: Sitting, BP Cuff Size: Adult)   Pulse 86   Temp 37 °C (98.6 °F)  "(Temporal)   Resp 16   Ht 1.791 m (5' 10.5\")   Wt 83.5 kg (184 lb)   SpO2 94%   BMI 26.03 kg/m²      Physical Exam  Constitutional:       General: He is not in acute distress.     Appearance: He is not diaphoretic.   HENT:      Head: Normocephalic and atraumatic.      Right Ear: External ear normal.      Left Ear: External ear normal.   Eyes:      Conjunctiva/sclera: Conjunctivae normal.      Pupils: Pupils are equal, round, and reactive to light.   Pulmonary:      Effort: Pulmonary effort is normal. No respiratory distress.   Musculoskeletal:      Cervical back: Normal range of motion.   Skin:     Findings: No rash.   Neurological:      Mental Status: He is alert and oriented to person, place, and time.   Psychiatric:         Mood and Affect: Mood and affect normal.         Cognition and Memory: Memory normal.         Judgment: Judgment normal.       Right hand: Dorsal/ulnar aspect of right hand exhibits a large skin tear.  There is no surrounding erythema or warmth but there is still significant soft tissue swelling.  No current discharge or bleeding.  Wound bed is showing some healing granulation tissue.  There is extensive violaceous ecchymosis on the dorsal aspect of the right hand and fingers as well as the palmar aspect of the right hand.  There is also some ecchymosis noted circumferentially around the wrist.  Patient has mildly decreased ROM with full flexion secondary to swelling and he still has considerable pain on the ulnar aspect of the hand/wrist.  Palmar aspect of right fifth digit exhibits a laceration with sutures in place.  Wound still not fully approximated at the proximal edge.  No surrounding erythema.  No purulent discharge.  Distal neurovascular status intact.  Cap refill of all fingers is less than 2 seconds.  Assessment & Plan     1. Laceration of left little finger without foreign body without damage to nail, subsequent encounter    2. Injury of right wrist, subsequent encounter  - " Acetaminophen-Codeine (TYLENOL/CODEINE #3) 300-30 MG Tab; Take 1 Tablet by mouth every 6 hours as needed (moderate to severe pain) for up to 3 days.  Dispense: 8 Tablet; Refill: 0    3. Crushing injury of right hand, subsequent encounter  - Acetaminophen-Codeine (TYLENOL/CODEINE #3) 300-30 MG Tab; Take 1 Tablet by mouth every 6 hours as needed (moderate to severe pain) for up to 3 days.  Dispense: 8 Tablet; Refill: 0    4. Skin tear of right hand without complication, subsequent encounter      Continued wound care instructions discussed.  It is only been 6 days since sutures were placed.  Recommend that he return in 2 to 3 days for suture removal.  At that time, if he is still having considerable tenderness, pain, and swelling in his right wrist/hand, we will consider getting a repeat x-ray as well.  Patient states that he is still not having significant improvement in his pain with OTC medications.  8 more tablets of Tylenol 3 prescribed.  Patient was cautioned not to use his medication while driving and to try to use this sparingly.  He should try OTC meds first.  He should also continue to apply ice to the affected area and keep it elevated is much as possible.              Differential Diagnosis, natural history, and supportive care discussed. Return to the Urgent Care or follow up with your PCP if symptoms fail to resolve, or for any new or worsening symptoms. Emergency room precautions discussed. Patient and/or family appears understanding of information.

## 2022-06-30 ENCOUNTER — APPOINTMENT (OUTPATIENT)
Dept: RADIOLOGY | Facility: IMAGING CENTER | Age: 77
End: 2022-06-30
Attending: PHYSICIAN ASSISTANT
Payer: MEDICARE

## 2022-06-30 ENCOUNTER — OFFICE VISIT (OUTPATIENT)
Dept: URGENT CARE | Facility: PHYSICIAN GROUP | Age: 77
End: 2022-06-30
Payer: MEDICARE

## 2022-06-30 VITALS
BODY MASS INDEX: 26.34 KG/M2 | DIASTOLIC BLOOD PRESSURE: 74 MMHG | OXYGEN SATURATION: 95 % | HEIGHT: 70 IN | SYSTOLIC BLOOD PRESSURE: 140 MMHG | TEMPERATURE: 98.2 F | WEIGHT: 184 LBS | RESPIRATION RATE: 20 BRPM | HEART RATE: 94 BPM

## 2022-06-30 DIAGNOSIS — S67.21XD CRUSHING INJURY OF RIGHT HAND, SUBSEQUENT ENCOUNTER: ICD-10-CM

## 2022-06-30 DIAGNOSIS — S61.217A LACERATION OF LEFT LITTLE FINGER WITHOUT FOREIGN BODY WITHOUT DAMAGE TO NAIL, INITIAL ENCOUNTER: ICD-10-CM

## 2022-06-30 DIAGNOSIS — S61.411D SKIN TEAR OF RIGHT HAND WITHOUT COMPLICATION, SUBSEQUENT ENCOUNTER: ICD-10-CM

## 2022-06-30 PROCEDURE — 99214 OFFICE O/P EST MOD 30 MIN: CPT | Performed by: PHYSICIAN ASSISTANT

## 2022-06-30 PROCEDURE — 73130 X-RAY EXAM OF HAND: CPT | Mod: TC,RT | Performed by: PHYSICIAN ASSISTANT

## 2022-06-30 RX ORDER — SULFAMETHOXAZOLE AND TRIMETHOPRIM 800; 160 MG/1; MG/1
1 TABLET ORAL 2 TIMES DAILY
Qty: 6 TABLET | Refills: 0 | Status: SHIPPED | OUTPATIENT
Start: 2022-06-30 | End: 2022-07-03

## 2022-06-30 ASSESSMENT — ENCOUNTER SYMPTOMS
CHILLS: 0
VOMITING: 0
NAUSEA: 0
FEVER: 0

## 2022-06-30 NOTE — PROGRESS NOTES
Subjective:   Jonathan Oh is a 76 y.o. male who presents for Follow-Up (Right arm; )        Patient presents for reevaluation of wounds and hand pain related to crushing injury that occurred approximately 9 days ago.  Patient has been taking Bactrim as prescribed.  He does not feel like the pain pills are helping and they are upsetting his stomach so he stopped taking these.  Overall his redness, swelling, hand pain are improving aside from focal pain at the fifth MCP that is largely unchanged since initial injury.    Review of Systems   Constitutional: Negative for chills and fever.   Gastrointestinal: Negative for nausea and vomiting.       PMH:  has a past medical history of Anesthesia, Cataract, Dental disorder, Diabetes (HCC), High cholesterol, and Hypertension.  MEDS:   Current Outpatient Medications:   •  sulfamethoxazole-trimethoprim (BACTRIM DS) 800-160 MG tablet, Take 1 Tablet by mouth 2 times a day for 3 days., Disp: 6 Tablet, Rfl: 0  •  Acetaminophen-Codeine (TYLENOL/CODEINE #3) 300-30 MG Tab, Take 1 Tablet by mouth every 6 hours as needed (moderate to severe pain) for up to 3 days., Disp: 8 Tablet, Rfl: 0  •  mupirocin (BACTROBAN) 2 % Ointment, Apply 1 Application topically 2 times a day., Disp: 22 g, Rfl: 0  •  sulfamethoxazole-trimethoprim (BACTRIM DS) 800-160 MG tablet, Take 1 Tablet by mouth 2 times a day for 7 days., Disp: 14 Tablet, Rfl: 0  •  atorvastatin (LIPITOR) 80 MG tablet, Take 80 mg by mouth every evening., Disp: , Rfl:   •  amLODIPine (NORVASC) 2.5 MG Tab, Take 2.5 mg by mouth every day., Disp: , Rfl:   •  metFORMIN (GLUCOPHAGE) 500 MG Tab, Take 500 mg by mouth 2 times a day., Disp: , Rfl:   •  lisinopril (PRINIVIL) 10 MG Tab, Take 10 mg by mouth every day., Disp: , Rfl:   •  SAXagliptin HCl 5 MG Tab, Take 5 mg by mouth every day., Disp: , Rfl:   •  glipiZIDE (GLUCOTROL) 5 MG Tab, Take 10 mg by mouth every day., Disp: , Rfl:   •  Magnesium Oxide 420 MG Tab, Take  by mouth every day.,  "Disp: , Rfl:   •  asa/apap/caffeine (EXCEDRIN) 250-250-65 MG Tab, Take 1 Tab by mouth as needed for Headache., Disp: , Rfl:   ALLERGIES: No Known Allergies  SURGHX:   Past Surgical History:   Procedure Laterality Date   • CATARACT PHACO WITH IOL Right 10/9/2018    Procedure: CATARACT PHACO WITH IOL;  Surgeon: Yoel Rockwell M.D.;  Location: SURGERY SAME DAY [facility];  Service: Ophthalmology   • PENETRATING KERATOPLASTY Right 10/9/2018    Procedure: DESCEMETS STRIPPING ENDOTHELIAL KERATOPLASTY;  Surgeon: Yoel Rockwell M.D.;  Location: SURGERY SAME DAY [facility];  Service: Ophthalmology   • CLAVICLE RESECTION  1990s     SOCHX:  reports that he quit smoking about 29 years ago. His smoking use included cigarettes. He has a 17.00 pack-year smoking history. He has never used smokeless tobacco. He reports that he does not drink alcohol and does not use drugs.  FH: Family history was reviewed, no pertinent findings to report   Objective:   BP (!) 140/74 (BP Location: Left arm, Patient Position: Sitting, BP Cuff Size: Adult)   Pulse 94   Temp 36.8 °C (98.2 °F) (Temporal)   Resp 20   Ht 1.778 m (5' 10\")   Wt 83.5 kg (184 lb)   SpO2 95%   BMI 26.40 kg/m²   Physical Exam  Vitals reviewed.   Constitutional:       General: He is not in acute distress.     Appearance: Normal appearance. He is well-developed. He is not toxic-appearing.   HENT:      Head: Normocephalic and atraumatic.      Right Ear: External ear normal.      Left Ear: External ear normal.      Nose: Nose normal.   Cardiovascular:      Rate and Rhythm: Normal rate and regular rhythm.   Pulmonary:      Effort: Pulmonary effort is normal. No respiratory distress.      Breath sounds: No stridor.   Musculoskeletal:      Comments: Right hand:  5cm skin tear dorsal, medial aspect of right hand. Granulation tissue present. Scant serousd/c medial aspect of wound. No surrounding erythema or warmth. Extensive ecchymosis on the dorsal aspect of the right " hand and fingers as well as the palmar aspect of the right hand.    1.5 cm well healing, well re approximated laceration on volar 5th proximal phalanx with 3 sutures in place. No dehiscence, no d/c.  Surrounding erythema or edema.    ROM WNL. Focally TTP 5 MCP. Distal neurovascular status intact.  Cap refill of all fingers is less than 2 seconds.   Skin:     General: Skin is dry.   Neurological:      Comments: Alert and oriented.    Psychiatric:         Speech: Speech normal.         Behavior: Behavior normal.         Imaging:  COMPARISON: Right wrist radiography, 6/21/2022.     FINDINGS:  Bones: Normal bone mineralization. No acute fracture. Chronic fracture of the medial and lateral aspects of the head of the right fifth finger middle phalanx. No focal bone lesion.     Joints: Remaining joint spaces are intact. No subluxation. Mild degenerative changes involving the right first interphalangeal joint.     Soft tissues: Dorsal ulnar right hand soft tissue swelling. Arteriosclerosis.     IMPRESSION:     1. Dorsal and ulnar right hand soft tissue swelling.  2. No acute fracture or subluxation.  3. Chronic post traumatic changes involving the head of the right fifth finger middle phalanx.  Assessment/Plan:   1. Crushing injury of right hand, subsequent encounter  - DX-HAND 3+ RIGHT; Future    2. Skin tear of right hand without complication, subsequent encounter  - sulfamethoxazole-trimethoprim (BACTRIM DS) 800-160 MG tablet; Take 1 Tablet by mouth 2 times a day for 3 days.  Dispense: 6 Tablet; Refill: 0    3. Laceration of right little finger without foreign body without damage to nail, initial encounter  - sulfamethoxazole-trimethoprim (BACTRIM DS) 800-160 MG tablet; Take 1 Tablet by mouth 2 times a day for 3 days.  Dispense: 6 Tablet; Refill: 0     1.  Due to persistent pain hand was reimaged to evaluate for occult fracture.  No evidence of acute bony changes. Pt may continue tylenol and ice prn.  Recommend follow-up  with PCP in 7 to 10 days.  If patient's pain fails to fully resolve in the next 3 to 4 weeks, new pain develops or pain worsens I would like him to be immediately reevaluated.    2.  Patient's skin tear is healing well- infection appears virtually resolved.  Wound was cleaned and dressed.  As a precaution we will extend patient's antibiotic therapy by 3 days.  I would like patient to wash the wound gently with soap and water and continue to apply topical antibiotic ointment as previously directed.  He should continue to monitor closely for signs of infection.  I would like him to be immediately reevaluated with any new redness, swelling, pain, or discharge.     3.  Wound is healing well.  3x simple interrupted sutures removed today.  Wound care reviewed and return precautions reviewed.     Differential diagnosis, natural history, supportive care, and indications for immediate follow-up discussed.    My total time spent caring for the patient on the day of the encounter was 35 minutes.   This does not include time spent on separately billable procedures/tests.

## 2022-07-04 ENCOUNTER — OFFICE VISIT (OUTPATIENT)
Dept: URGENT CARE | Facility: PHYSICIAN GROUP | Age: 77
End: 2022-07-04
Payer: MEDICARE

## 2022-07-04 VITALS
DIASTOLIC BLOOD PRESSURE: 48 MMHG | HEIGHT: 70 IN | WEIGHT: 184 LBS | RESPIRATION RATE: 20 BRPM | SYSTOLIC BLOOD PRESSURE: 122 MMHG | BODY MASS INDEX: 26.34 KG/M2 | TEMPERATURE: 97.4 F | OXYGEN SATURATION: 95 % | HEART RATE: 84 BPM

## 2022-07-04 DIAGNOSIS — Z51.89 ENCOUNTER FOR WOUND RE-CHECK: ICD-10-CM

## 2022-07-04 PROCEDURE — 99213 OFFICE O/P EST LOW 20 MIN: CPT | Performed by: STUDENT IN AN ORGANIZED HEALTH CARE EDUCATION/TRAINING PROGRAM

## 2022-07-04 NOTE — PROGRESS NOTES
"Subjective     Jonathan Oh is a 76 y.o. male who presents with Hand Problem (Possible Right hand infection, has been following up with [facility], still not feeling better. Swelling in hand )            Patient presents for reevaluation of wounds and hand pain related to crushing injury. Patient has completed Bactrim as prescribed.  He is still concerned about the redness but states redness/swelling has improved. He denies increased warmth, change in temperature or numbness/tingling to right hand and fingers.      Review of Systems   Constitutional: Negative.    HENT: Negative.    Eyes: Negative.    Respiratory: Negative.    Cardiovascular: Negative.    Genitourinary: Negative.    Musculoskeletal: Negative for joint pain.   Skin:        Positive for right hand wound.   Neurological: Negative for tingling and weakness.              Objective     /48 (BP Location: Right arm, Patient Position: Sitting, BP Cuff Size: Adult)   Pulse 84   Temp 36.3 °C (97.4 °F) (Temporal)   Resp 20   Ht 1.778 m (5' 10\")   Wt 83.5 kg (184 lb)   SpO2 95%   BMI 26.40 kg/m²      Physical Exam  Constitutional:       Appearance: Normal appearance.   Cardiovascular:      Rate and Rhythm: Normal rate and regular rhythm.      Heart sounds: Normal heart sounds.   Pulmonary:      Effort: Pulmonary effort is normal.      Breath sounds: Normal breath sounds.   Musculoskeletal:      Right hand: No tenderness or bony tenderness. Normal range of motion. Normal sensation. There is no disruption of two-point discrimination. Normal capillary refill. Normal pulse.      Left hand: Normal.   Skin:     General: Skin is warm and dry.      Capillary Refill: Capillary refill takes less than 2 seconds.      Comments: approx 4-5cm healing skin tear located on dorsal, medial aspect of right hand. Granulation tissue present. Scant serous active drainage. No surrounding erythema or warmth.No signs of cellulitis.    1.5 cm well healing, well re-approximated " laceration on volar 5th proximal phalanx. No active drainage. No surrounding erythema or edema.   Neurological:      Mental Status: He is alert.                             Assessment & Plan        1. Encounter for wound re-check  - Referral to Wound Clinic    Patient presents today to urgent care for wound check regarding laceration on 6/21. Patient has f/u in urgent care four times since initial injury. Hand has been imaged/re-image and no acute bony changes/occult fracture has been seen. Patient has also completed abx therapy on bactrim and was extended an additional three days at last visit on 6/30. Wound is healing well and wound care instructions reviewed with patient. Due to increased concern on wound healing, referral to wound care placed.    Pattient educated on wound care instructions. Wound care instructions include:    • Wash the wound with mild soap and water or a saline solution.  • If you use soap, rinse the wound with water to remove all soap.  • Do not rub the wound dry. Pat the wound gently, or let it air dry.  • Keep the dressing dry.  • Change any dressing if it gets wet, gets dirty, or starts to smell bad.   • To change your bandage: Wash your hands with soap and water before and after you change your bandage.   • Check your wound every day for signs of infection and return to urgent care or ED if notices: increased redness, swelling, or pain, more fluid, drainage or blood, increased warmth, pus or a bad smell.  • Do not scratch or pick at the wound.  • Protect the injured area until it has healed.    I personally reviewed prior external notes and test results pertinent to today's visit.     Instructed to return to urgent care or nearest emergency department if symptoms fail to improve, for any change in condition, further concerns, or new concerning symptoms.    Patient states understanding and agrees with the plan of care and discharge instructions.

## 2022-07-21 ASSESSMENT — ENCOUNTER SYMPTOMS
RESPIRATORY NEGATIVE: 1
CARDIOVASCULAR NEGATIVE: 1
WEAKNESS: 0
CONSTITUTIONAL NEGATIVE: 1
EYES NEGATIVE: 1
TINGLING: 0

## 2022-10-28 ENCOUNTER — DOCUMENTATION (OUTPATIENT)
Dept: HEALTH INFORMATION MANAGEMENT | Facility: OTHER | Age: 77
End: 2022-10-28
Payer: MEDICARE

## 2023-05-11 ENCOUNTER — TELEPHONE (OUTPATIENT)
Dept: HEALTH INFORMATION MANAGEMENT | Facility: OTHER | Age: 78
End: 2023-05-11

## 2024-06-24 NOTE — TELEPHONE ENCOUNTER
Message: Called and spoke with patient to schedule annual [facility] Visit with [facility]. Pt declined scheduling for now, states he [facility], explained the benefits, he still declined.  Let him know if he changed his mind he can call his  on the number on back of his card. Pt agreed.

## 2024-09-05 ENCOUNTER — DOCUMENTATION (OUTPATIENT)
Dept: HEALTH INFORMATION MANAGEMENT | Facility: OTHER | Age: 79
End: 2024-09-05
Payer: MEDICARE

## (undated) DEVICE — SYRINGE SAFETY TB 1 ML 27 GA X 1/2 IN (100/BX 4BX/CA)

## (undated) DEVICE — GLOVE SZ 7 BIOGEL PI MICRO - PF LF (50PR/BX 4BX/CA)

## (undated) DEVICE — NEEDLE NON SAFETY 27GA X 1-1/4 IN HYPO (100EA/BX)

## (undated) DEVICE — LACTATED RINGERS INJ 1000 ML - (14EA/CA 60CA/PF)

## (undated) DEVICE — SPEAR EYE SPNG 3ANG MLBL HNDL - (10/ST18ST/PK 180/PK 1PK/SP)

## (undated) DEVICE — PAD EYE GAUZE COVERED OVAL 1 5/8 X 2 5/8" STERILE"

## (undated) DEVICE — GLOVE BIOGEL SZ 7.5 SURGICAL PF LTX - (50PR/BX 4BX/CA)

## (undated) DEVICE — SENSOR SPO2 NEO LNCS ADHESIVE (20/BX) SEE USER NOTES

## (undated) DEVICE — TIP POLISHER - 10/BX 37BEND21GAW/.3MM SIDE-

## (undated) DEVICE — Device

## (undated) DEVICE — WATER IRRIGATION STERILE 1000ML (12EA/CA)

## (undated) DEVICE — MASK AIRWAY FLEXIBLE SINGLE-USE SIZE 5 ADULTS (10EA/BX)

## (undated) DEVICE — SODIUM CHL IRRIGATION 0.9% 1000ML (12EA/CA)

## (undated) DEVICE — KIT, EYE POST-OP FOR PHACOS

## (undated) DEVICE — CONTAINER SPECIMEN BAG OR - STERILE 4 OZ W/LID (100EA/CA)

## (undated) DEVICE — CANISTER SUCTION RIGID RED 1500CC (40EA/CA)

## (undated) DEVICE — MEDICINE CUP STERILE 2 OZ - (100/CA)

## (undated) DEVICE — KIT ANESTHESIA W/CIRCUIT & 3/LT BAG W/FILTER (20EA/CA)

## (undated) DEVICE — KIT  I.V. START (100EA/CA)

## (undated) DEVICE — CLOSURE SKIN STRIP 1/2 X 4 IN - (STERI STRIP) (50/BX 4BX/CA)

## (undated) DEVICE — CATHETER IV 20 GA X 1-1/4 ---SURG.& SDS ONLY--- (50EA/BX)

## (undated) DEVICE — SYRINGE SAFETY 3 ML 18 GA X 1 1/2 BLUNT LL (100/BX 8BX/CA)

## (undated) DEVICE — TUBE CONNECTING SUCTION - CLEAR PLASTIC STERILE 72 IN (50EA/CA)

## (undated) DEVICE — SYRINGE SAFETY TB 1 CC 25 GA X 5/8 - NDL  (50/BX)

## (undated) DEVICE — TUBING CLEARLINK DUO-VENT - C-FLO (48EA/CA)

## (undated) DEVICE — NEEDLE CYSTOTOME OPTH VSTC  0.4MM X 16MM - (10/CA)

## (undated) DEVICE — SET LEADWIRE 5 LEAD BEDSIDE DISPOSABLE ECG (1SET OF 5/EA)

## (undated) DEVICE — CANISTER SUCTION 3000ML MECHANICAL FILTER AUTO SHUTOFF MEDI-VAC NONSTERILE LF DISP  (40EA/CA)

## (undated) DEVICE — CONTAINER, SPECIMEN, STERILE

## (undated) DEVICE — TIP POLYMER I&A

## (undated) DEVICE — SET EXTENSION WITH 2 PORTS (48EA/CA) ***PART #2C8610 IS A SUBSTITUTE*****

## (undated) DEVICE — SHIELD OPTH AL GRTR CVR FOX (50EA/BX)

## (undated) DEVICE — CANNULA SUB-TENONS ANESTH. 1.1X25MM 19GAX1IN (10EA/SP)

## (undated) DEVICE — KNIFE 2.75MM ANGL SNGL BEV/SAFETY (10/CA 10/SP)

## (undated) DEVICE — GLOVE BIOGEL SZ 8.5 SURGICAL PF LTX - (50PR/BX 4BX/CA)

## (undated) DEVICE — GOWN SURGEONS LARGE - (32/CA)

## (undated) DEVICE — NEEDLE  NON-SAFETY 30GA X 3/4 IN (10EA/CA)

## (undated) DEVICE — KNIFE VITRECTOMY 20GA STRAIGHT (6EA/BX)

## (undated) DEVICE — CARTRIDGE MONARCH C - (10EA/BX)

## (undated) DEVICE — PUNCH CORNEAL VACUUM 8.00

## (undated) DEVICE — PACK BASIC CATARACT - (4/BX)

## (undated) DEVICE — SUCTION INSTRUMENT YANKAUER BULBOUS TIP W/O VENT (50EA/CA)

## (undated) DEVICE — ELECTRODE 850 FOAM ADHESIVE - HYDROGEL RADIOTRNSPRNT (50/PK)